# Patient Record
Sex: FEMALE | Race: WHITE | Employment: OTHER | URBAN - METROPOLITAN AREA
[De-identification: names, ages, dates, MRNs, and addresses within clinical notes are randomized per-mention and may not be internally consistent; named-entity substitution may affect disease eponyms.]

---

## 2021-07-07 ENCOUNTER — APPOINTMENT (OUTPATIENT)
Dept: CT IMAGING | Age: 81
End: 2021-07-07
Attending: EMERGENCY MEDICINE
Payer: MEDICARE

## 2021-07-07 ENCOUNTER — HOSPITAL ENCOUNTER (EMERGENCY)
Age: 81
Discharge: HOME | End: 2021-07-07
Attending: EMERGENCY MEDICINE
Payer: MEDICARE

## 2021-07-07 VITALS
SYSTOLIC BLOOD PRESSURE: 151 MMHG | TEMPERATURE: 99 F | HEART RATE: 78 BPM | OXYGEN SATURATION: 96 % | DIASTOLIC BLOOD PRESSURE: 63 MMHG | WEIGHT: 100 LBS | BODY MASS INDEX: 17.72 KG/M2 | HEIGHT: 63 IN | RESPIRATION RATE: 18 BRPM

## 2021-07-07 DIAGNOSIS — R19.7 DIARRHEA OF PRESUMED INFECTIOUS ORIGIN: Primary | ICD-10-CM

## 2021-07-07 LAB
ALBUMIN SERPL-MCNC: 3.5 G/DL (ref 3.4–5)
ALBUMIN/GLOB SERPL: 1.1 {RATIO} (ref 1–2)
ALP LIVER SERPL-CCNC: 58 U/L
ALT SERPL-CCNC: 17 U/L
ANION GAP SERPL CALC-SCNC: 6 MMOL/L (ref 0–18)
AST SERPL-CCNC: 23 U/L (ref 15–37)
BASOPHILS # BLD AUTO: 0.03 X10(3) UL (ref 0–0.2)
BASOPHILS NFR BLD AUTO: 0.3 %
BILIRUB SERPL-MCNC: 0.5 MG/DL (ref 0.1–2)
BILIRUB UR QL STRIP.AUTO: NEGATIVE
BUN BLD-MCNC: 12 MG/DL (ref 7–18)
BUN/CREAT SERPL: 11.4 (ref 10–20)
CALCIUM BLD-MCNC: 9.1 MG/DL (ref 8.5–10.1)
CHLORIDE SERPL-SCNC: 107 MMOL/L (ref 98–112)
CLARITY UR REFRACT.AUTO: CLEAR
CO2 SERPL-SCNC: 26 MMOL/L (ref 21–32)
COLOR UR AUTO: YELLOW
CREAT BLD-MCNC: 1.05 MG/DL
DEPRECATED RDW RBC AUTO: 43.5 FL (ref 35.1–46.3)
EOSINOPHIL # BLD AUTO: 0.21 X10(3) UL (ref 0–0.7)
EOSINOPHIL NFR BLD AUTO: 2.4 %
ERYTHROCYTE [DISTWIDTH] IN BLOOD BY AUTOMATED COUNT: 13.2 % (ref 11–15)
GLOBULIN PLAS-MCNC: 3.3 G/DL (ref 2.8–4.4)
GLUCOSE BLD-MCNC: 124 MG/DL (ref 70–99)
GLUCOSE UR STRIP.AUTO-MCNC: NEGATIVE MG/DL
HCT VFR BLD AUTO: 40.1 %
HGB BLD-MCNC: 13.4 G/DL
IMM GRANULOCYTES # BLD AUTO: 0.03 X10(3) UL (ref 0–1)
IMM GRANULOCYTES NFR BLD: 0.3 %
KETONES UR STRIP.AUTO-MCNC: NEGATIVE MG/DL
LIPASE SERPL-CCNC: 229 U/L (ref 73–393)
LYMPHOCYTES # BLD AUTO: 2.15 X10(3) UL (ref 1–4)
LYMPHOCYTES NFR BLD AUTO: 24.4 %
M PROTEIN MFR SERPL ELPH: 6.8 G/DL (ref 6.4–8.2)
MCH RBC QN AUTO: 29.9 PG (ref 26–34)
MCHC RBC AUTO-ENTMCNC: 33.4 G/DL (ref 31–37)
MCV RBC AUTO: 89.5 FL
MONOCYTES # BLD AUTO: 0.65 X10(3) UL (ref 0.1–1)
MONOCYTES NFR BLD AUTO: 7.4 %
NEUTROPHILS # BLD AUTO: 5.74 X10 (3) UL (ref 1.5–7.7)
NEUTROPHILS # BLD AUTO: 5.74 X10(3) UL (ref 1.5–7.7)
NEUTROPHILS NFR BLD AUTO: 65.2 %
NITRITE UR QL STRIP.AUTO: NEGATIVE
OSMOLALITY SERPL CALC.SUM OF ELEC: 289 MOSM/KG (ref 275–295)
PH UR STRIP.AUTO: 6 [PH] (ref 5–8)
PLATELET # BLD AUTO: 221 10(3)UL (ref 150–450)
POTASSIUM SERPL-SCNC: 3.7 MMOL/L (ref 3.5–5.1)
PROT UR STRIP.AUTO-MCNC: NEGATIVE MG/DL
RBC # BLD AUTO: 4.48 X10(6)UL
SODIUM SERPL-SCNC: 139 MMOL/L (ref 136–145)
SP GR UR STRIP.AUTO: 1.01 (ref 1–1.03)
UROBILINOGEN UR STRIP.AUTO-MCNC: 0.2 MG/DL
WBC # BLD AUTO: 8.8 X10(3) UL (ref 4–11)

## 2021-07-07 PROCEDURE — 83690 ASSAY OF LIPASE: CPT | Performed by: EMERGENCY MEDICINE

## 2021-07-07 PROCEDURE — 81001 URINALYSIS AUTO W/SCOPE: CPT | Performed by: EMERGENCY MEDICINE

## 2021-07-07 PROCEDURE — 80053 COMPREHEN METABOLIC PANEL: CPT | Performed by: EMERGENCY MEDICINE

## 2021-07-07 PROCEDURE — 87186 SC STD MICRODIL/AGAR DIL: CPT | Performed by: EMERGENCY MEDICINE

## 2021-07-07 PROCEDURE — 96360 HYDRATION IV INFUSION INIT: CPT

## 2021-07-07 PROCEDURE — 99284 EMERGENCY DEPT VISIT MOD MDM: CPT

## 2021-07-07 PROCEDURE — 96361 HYDRATE IV INFUSION ADD-ON: CPT

## 2021-07-07 PROCEDURE — 87088 URINE BACTERIA CULTURE: CPT | Performed by: EMERGENCY MEDICINE

## 2021-07-07 PROCEDURE — 87086 URINE CULTURE/COLONY COUNT: CPT | Performed by: EMERGENCY MEDICINE

## 2021-07-07 PROCEDURE — 85025 COMPLETE CBC W/AUTO DIFF WBC: CPT | Performed by: EMERGENCY MEDICINE

## 2021-07-07 PROCEDURE — 74177 CT ABD & PELVIS W/CONTRAST: CPT | Performed by: EMERGENCY MEDICINE

## 2021-07-07 RX ORDER — ONDANSETRON 8 MG/1
8 TABLET, ORALLY DISINTEGRATING ORAL EVERY 6 HOURS PRN
Qty: 10 TABLET | Refills: 0 | Status: SHIPPED | OUTPATIENT
Start: 2021-07-07

## 2021-07-07 RX ORDER — ONDANSETRON 2 MG/ML
4 INJECTION INTRAMUSCULAR; INTRAVENOUS
Status: DISCONTINUED | OUTPATIENT
Start: 2021-07-07 | End: 2021-07-07

## 2021-07-07 RX ORDER — MEMANTINE HYDROCHLORIDE 10 MG/1
10 TABLET ORAL 2 TIMES DAILY
COMMUNITY

## 2021-07-07 NOTE — ED PROVIDER NOTES
Patient Seen in: [facility] In [place]      History   Patient presents with:  Nausea/Vomiting/Diarrhea    Stated Complaint: diarrhea, dehydration    HPI/Subjective:   HPI    Patient is a poor historian secondary history of Alzheimer's kg/m²         Physical Exam  General: The patient is awake and alert. She will answer some simple questions but her memory is very poor.   Eyes: sclera white, conjunctiva pink and moist.  Lids and lashes are normal.  Nose: Unremarkable without purulent joslyn ------                     CBC W/ DIFFERENTIAL[[id number]]                              Final result                 Please view results for these tests on the individual orders.    RAINBOW DRAW LAVENDER   RAINBOW DRAW LIGHT GREEN   C. DIFFICILE(TOXIG per her request encouraged to follow-up closely with her primary care doctor                       Disposition and Plan     Clinical Impression:  Diarrhea of presumed infectious origin  (primary encounter diagnosis)     Disposition:  Discharge  7/7/2021 12

## 2021-07-10 NOTE — ED NOTES
Discussed culture results with patient daughter - patient not having UTI symptoms at this time and diarrhea is resolving. Discussed with patient daughter to monitor for new or worsening symptoms and to f/u as needed. Verbalized understanding.

## 2024-01-01 ENCOUNTER — APPOINTMENT (OUTPATIENT)
Dept: GENERAL RADIOLOGY | Age: 84
End: 2024-01-01
Payer: MEDICARE

## 2024-01-01 ENCOUNTER — HOSPITAL ENCOUNTER (EMERGENCY)
Age: 84
Discharge: HOME | End: 2024-01-01
Attending: STUDENT IN AN ORGANIZED HEALTH CARE EDUCATION/TRAINING PROGRAM
Payer: MEDICARE

## 2024-01-01 VITALS
OXYGEN SATURATION: 96 % | DIASTOLIC BLOOD PRESSURE: 67 MMHG | SYSTOLIC BLOOD PRESSURE: 134 MMHG | WEIGHT: 100 LBS | RESPIRATION RATE: 18 BRPM | HEART RATE: 76 BPM | TEMPERATURE: 99 F | HEIGHT: 63 IN | BODY MASS INDEX: 17.72 KG/M2

## 2024-01-01 DIAGNOSIS — J40 BRONCHITIS WITH ACUTE WHEEZING: Primary | ICD-10-CM

## 2024-01-01 LAB
ANION GAP SERPL CALC-SCNC: 9 MMOL/L (ref 0–18)
BASOPHILS # BLD AUTO: 0.03 X10(3) UL (ref 0–0.2)
BASOPHILS NFR BLD AUTO: 0.5 %
BUN BLD-MCNC: 21 MG/DL (ref 9–23)
CALCIUM BLD-MCNC: 10 MG/DL (ref 8.5–10.1)
CHLORIDE SERPL-SCNC: 108 MMOL/L (ref 98–112)
CO2 SERPL-SCNC: 21 MMOL/L (ref 21–32)
CREAT BLD-MCNC: 1.42 MG/DL
EGFRCR SERPLBLD CKD-EPI 2021: 37 ML/MIN/1.73M2 (ref 60–?)
EOSINOPHIL # BLD AUTO: 0.04 X10(3) UL (ref 0–0.7)
EOSINOPHIL NFR BLD AUTO: 0.7 %
ERYTHROCYTE [DISTWIDTH] IN BLOOD BY AUTOMATED COUNT: 14.1 %
GLUCOSE BLD-MCNC: 110 MG/DL (ref 70–99)
HCT VFR BLD AUTO: 43.1 %
HGB BLD-MCNC: 14.2 G/DL
IMM GRANULOCYTES # BLD AUTO: 0.02 X10(3) UL (ref 0–1)
IMM GRANULOCYTES NFR BLD: 0.3 %
LYMPHOCYTES # BLD AUTO: 1.32 X10(3) UL (ref 1–4)
LYMPHOCYTES NFR BLD AUTO: 21.9 %
MCH RBC QN AUTO: 30.1 PG (ref 26–34)
MCHC RBC AUTO-ENTMCNC: 32.9 G/DL (ref 31–37)
MCV RBC AUTO: 91.5 FL
MONOCYTES # BLD AUTO: 0.47 X10(3) UL (ref 0.1–1)
MONOCYTES NFR BLD AUTO: 7.8 %
NEUTROPHILS # BLD AUTO: 4.14 X10 (3) UL (ref 1.5–7.7)
NEUTROPHILS # BLD AUTO: 4.14 X10(3) UL (ref 1.5–7.7)
NEUTROPHILS NFR BLD AUTO: 68.8 %
OSMOLALITY SERPL CALC.SUM OF ELEC: 290 MOSM/KG (ref 275–295)
PLATELET # BLD AUTO: 213 10(3)UL (ref 150–450)
POTASSIUM SERPL-SCNC: 4.5 MMOL/L (ref 3.5–5.1)
RBC # BLD AUTO: 4.71 X10(6)UL
SODIUM SERPL-SCNC: 138 MMOL/L (ref 136–145)
WBC # BLD AUTO: 6 X10(3) UL (ref 4–11)

## 2024-01-01 PROCEDURE — 99284 EMERGENCY DEPT VISIT MOD MDM: CPT

## 2024-01-01 PROCEDURE — 71046 X-RAY EXAM CHEST 2 VIEWS: CPT

## 2024-01-01 PROCEDURE — 80048 BASIC METABOLIC PNL TOTAL CA: CPT | Performed by: STUDENT IN AN ORGANIZED HEALTH CARE EDUCATION/TRAINING PROGRAM

## 2024-01-01 PROCEDURE — 85025 COMPLETE CBC W/AUTO DIFF WBC: CPT | Performed by: STUDENT IN AN ORGANIZED HEALTH CARE EDUCATION/TRAINING PROGRAM

## 2024-01-01 PROCEDURE — 94640 AIRWAY INHALATION TREATMENT: CPT

## 2024-01-01 PROCEDURE — 96360 HYDRATION IV INFUSION INIT: CPT

## 2024-01-01 RX ORDER — PREDNISONE 20 MG/1
20 TABLET ORAL DAILY
Qty: 4 TABLET | Refills: 0 | Status: SHIPPED | OUTPATIENT
Start: 2024-01-01 | End: 2024-01-05

## 2024-01-01 RX ORDER — PREDNISONE 20 MG/1
40 TABLET ORAL ONCE
Status: COMPLETED | OUTPATIENT
Start: 2024-01-01 | End: 2024-01-01

## 2024-01-01 RX ORDER — ALBUTEROL SULFATE 90 UG/1
2 AEROSOL, METERED RESPIRATORY (INHALATION) EVERY 4 HOURS PRN
Qty: 1 EACH | Refills: 0 | Status: SHIPPED | OUTPATIENT
Start: 2024-01-01 | End: 2024-01-31

## 2024-01-01 RX ORDER — FLUTICASONE PROPIONATE AND SALMETEROL 100; 50 UG/1; UG/1
1 POWDER RESPIRATORY (INHALATION) 2 TIMES DAILY
Qty: 1 EACH | Refills: 0 | Status: SHIPPED | OUTPATIENT
Start: 2024-01-01

## 2024-01-01 RX ORDER — IPRATROPIUM BROMIDE AND ALBUTEROL SULFATE 2.5; .5 MG/3ML; MG/3ML
3 SOLUTION RESPIRATORY (INHALATION) ONCE
Status: COMPLETED | OUTPATIENT
Start: 2024-01-01 | End: 2024-01-01

## 2024-01-01 NOTE — ED INITIAL ASSESSMENT (HPI)
82 y/o F arrives to ED with c/o SOB and cough that started 12/25. Per family, patient was placed on zpac on Friday 12/29. Patient had one episode of diarrhea this morning. Per family, patient has not had much of an appetite recently.

## 2024-01-02 NOTE — ED PROVIDER NOTES
History     Chief Complaint   Patient presents with    Difficulty Breathing    Cough/URI       HPI    83 year old female with history of hypertension, hyperlipidemia, Alzheimer's dementia brought in by family for evaluation of cough.  For the past week patient has been having dyspnea, cough, congestion, sore throat, lost her voice with worsening symptoms around [place] time.  Symptoms have slightly improved but are persistent.  Patient now has generalized fatigue and decreased appetite.  They were prescribed a Z-Kain which has not helped.  They report a prior history of asthma, patient is been smoking for many many years, denies a prior history of COPD.  She previously had albuterol that she was using but no longer has any inhalers.          Past Medical History:   Diagnosis Date    Alzheimer disease (HCC)     Carotid stenosis     Colon polyps     GI bleed     High blood pressure     High cholesterol     Hypoglycemia     MVP (mitral valve prolapse)     Sebaceous cyst        Past Surgical History:   Procedure Laterality Date    ADENOIDECTOMY      OOPHORECTOMY      TONSILLECTOMY         Social History     Socioeconomic History    Marital status:    Tobacco Use    Smoking status: Every Day     Packs/day: .5     Types: Cigarettes    Smokeless tobacco: Never   Vaping Use    Vaping Use: Never used   Substance and Sexual Activity    Alcohol use: No    Drug use: No                   Physical Exam     ED Triage Vitals [01/01/24 1706]   /58   Pulse 103   Resp 19   Temp 98.9 °F (37.2 °C)   Temp src Temporal   SpO2 96 %   O2 Device None (Room air)       Physical Exam  Constitutional:       General: She is not in acute distress.  Eyes:      Extraocular Movements: Extraocular movements intact.   Cardiovascular:      Rate and Rhythm: Normal rate.      Pulses: Normal pulses.   Pulmonary:      Effort: Pulmonary effort is normal. No respiratory distress.      Breath sounds: Wheezing present.      Comments:  Prolonged expiratory phase  Musculoskeletal:      Cervical back: Normal range of motion.      Right lower leg: No edema.      Left lower leg: No edema.   Neurological:      General: No focal deficit present.      Mental Status: She is alert.              ED Course     Labs Reviewed   BASIC METABOLIC PANEL (8) - Abnormal; Notable for the following components:       Result Value    Glucose 110 (*)     Creatinine 1.42 (*)     eGFR-Cr 37 (*)     All other components within normal limits   CBC WITH DIFFERENTIAL WITH PLATELET    Narrative:     The following orders were created for panel order CBC With Differential With Platelet.  Procedure                               Abnormality         Status                     ---------                               -----------         ------                     CBC W/ DIFFERENTIAL[[id number]]                              Final result                 Please view results for these tests on the individual orders.   RAINBOW DRAW LAVENDER   RAINBOW DRAW LIGHT GREEN   CBC W/ DIFFERENTIAL     XR CHEST PA + LAT CHEST (CPT=71046)    Result Date: 1/1/2024  CONCLUSION:   Normal cardiac and mediastinal contours.  The lungs are hyperexpanded with biapical parenchymal scarring.  No airspace consolidation.  The pleural spaces are clear.   LOCATION:  [facility]   Dictated by (CST): Gloria Cisneros MD on 1/01/2024 at 6:03 PM     Finalized by (CST): Gloria Cisneros MD on 1/01/2024 at 6:03 PM            MDM     Vitals:    01/01/24 1706 01/01/24 1959   BP: 109/58 134/67   Pulse: 103 76   Resp: 19 18   Temp: 98.9 °F (37.2 °C)    TempSrc: Temporal    SpO2: 96% 96%   Weight: 45.4 kg    Height: 160 cm (5' 3\")        Viral upper respiratory infection, bronchitis, asthma, pneumonia on differential.  Very likely undiagnosed COPD.  Barrel chest noted on x-ray, will give patient DuoNeb, check labs give fluids and reevaluate.    ED Course as of 01/01/24  2008  ------------------------------------------------------------  Time: 01/01 1840  Comment: My interpretation of x-ray with hyperexpanded lungs, no other acute pathology.  ------------------------------------------------------------  Time: 01/01 1938  Comment: Labs with mild CODY, given intravenous hydration here  ------------------------------------------------------------  Time: 01/01 2005  Comment: Reevaluation, patient is feeling improved.  Improved lung sounds.  Discussed findings with patient and family at bedside.  Will send home with prednisone burst, start patient on Advair, pulmonary referral.         Disposition and Plan     Clinical Impression:  1. Bronchitis with acute wheezing        Disposition:  Discharge    Follow-up:  Cristi Lin MD  [address] [place] [address]   [place] [address]  [phone]    Follow up        Medications Prescribed:  Current Discharge Medication List        START taking these medications    Details   fluticasone-salmeterol (ADVAIR DISKUS) 100-50 MCG/ACT Inhalation Aerosol Powder, Breath Activated Inhale 1 puff into the lungs 2 (two) times daily.  Qty: 1 each, Refills: 0      predniSONE 20 MG Oral Tab Take 1 tablet (20 mg total) by mouth daily for 4 days.  Qty: 4 tablet, Refills: 0      !! albuterol 108 (90 Base) MCG/ACT Inhalation Aero Soln Inhale 2 puffs into the lungs every 4 (four) hours as needed for Wheezing.  Qty: 1 each, Refills: 0       !! - Potential duplicate medications found. Please discuss with provider.

## 2025-05-05 NOTE — ED INITIAL ASSESSMENT (HPI)
Recent constipation and was given stool softner and now with blood in stool and abd pain.  C/o nausea

## 2025-05-05 NOTE — ED PROVIDER NOTES
Patient Seen in: [facility] In [place]      History     Chief Complaint   Patient presents with    GI Bleeding     Stated Complaint: blood in stool with abd pain    Subjective:   HPI    84-year-old female who presents after family states they noted blood in the toilet today.  They report she has been persistently reporting the urge to have a bowel movement and I think based on the toilet today was blood and they do not believe she has had a bowel movement in some time.  She has some history of constipation.  They have been trying stool softeners when she has had no relief.  Have not tried enemas.  Patient suffers from dementia and family reports she is a very poor historian.  No reports of vomiting.  History of Present Illness               Objective:     Past Medical History:    Alzheimer disease (HCC)    Carotid stenosis    Colon polyps    GI bleed    High blood pressure    High cholesterol    Hypoglycemia    MVP (mitral valve prolapse)    Sebaceous cyst              Past Surgical History:   Procedure Laterality Date    Adenoidectomy      Oophorectomy      Tonsillectomy                  Social History     Socioeconomic History    Marital status:    Tobacco Use    Smoking status: Every Day     Current packs/day: 0.50     Types: Cigarettes    Smokeless tobacco: Never   Vaping Use    Vaping status: Never Used   Substance and Sexual Activity    Alcohol use: No    Drug use: No                                Physical Exam     ED Triage Vitals [05/05/25 1031]   BP (!) 177/81   Pulse 110   Resp 18   Temp 98.2 °F (36.8 °C)   Temp src Temporal   SpO2 96 %   O2 Device None (Room air)       Current Vitals:   Vital Signs  BP: (!) 168/92  Pulse: 94  Resp: 18  Temp: 98.1 °F (36.7 °C)  Temp src: Oral    Oxygen Therapy  SpO2: 97 %  O2 Device: None (Room air)        Physical Exam  General:  Vitals as listed.    HEENT: Sclerae anicteric.  Conjunctivae show no pallor.  Oropharynx clear, mucous membranes  moist   Neck: supple, no rigidity   Lungs: good air exchange and clear   Heart: regular rate rhythm and no murmur   Abdomen: Soft and nontender.  No abdominal masses.  No peritoneal signs   Rectum: No stool in the rectal vault.  No gross blood.  Extremities: no edema, normal peripheral pulses   Neuro: Alert oriented and nonfocal   Skin: no rashes or nodules  Physical Exam                ED Course     Labs Reviewed   COMP METABOLIC PANEL (14) - Abnormal; Notable for the following components:       Result Value    Glucose 123 (*)     BUN 26 (*)     Creatinine 1.26 (*)     Calculated Osmolality 300 (*)     eGFR-Cr 43 (*)     All other components within normal limits   CBC WITH DIFFERENTIAL WITH PLATELET - Abnormal; Notable for the following components:    WBC 12.8 (*)     Neutrophil Absolute Prelim 10.43 (*)     Neutrophil Absolute 10.43 (*)     All other components within normal limits          Results            CT ABDOMEN+PELVIS(CPT=74176)  Result Date: 5/5/2025  PROCEDURE:  CT ABDOMEN+PELVIS (CPT=74176)  COMPARISON:  [facility], CT, CT ABDOMEN+PELVIS(CONTRAST ONLY)(CPT=74177), 7/07/2021, 11:28 AM.  INDICATIONS:  blood in stool with abd pain  TECHNIQUE:  Unenhanced multislice CT scanning was performed from the dome of the diaphragm to the pubic symphysis.  Dose reduction techniques were used. Dose information is transmitted to the ACR (American College of Radiology) NRDR (National Radiology Data Registry) which includes the Dose Index Registry.  PATIENT STATED HISTORY: (As transcribed by Technologist)  Patient has recent constipation and was given stool softner and now with blood in stool with abdominal pains and nausea.    FINDINGS:  LIVER:  9 mm cyst in the right hepatic lobe.  Digital subcentimeter lesions are present, which are too small to characterize.  No steatosis.  Normal morphology. BILIARY:  No visible dilatation or calcification.  PANCREAS:  No lesion, fluid collection, ductal dilatation, or atrophy.   SPLEEN:  No enlargement or focal lesion.  KIDNEYS:  Multiple renal cysts, largest measuring up to 2.7 cm in the right kidney.  No hydronephrosis or obstructive nephrolithiasis. ADRENALS:  No mass or enlargement.  AORTA/VASCULAR:    Atherosclerotic disease without aneurysm RETROPERITONEUM:  No mass or adenopathy.  BOWEL/MESENTERY:  No dilated bowel.  Colonic diverticulosis, without diverticulitis.  Normal appendix.  The descending colon is collapsed, however there is suggestion of wall thickening with pericolonic fluid sand fat stranding, which could reflect non-specific colitis. ABDOMINAL WALL:  No significant findings URINARY BLADDER:  No visible focal wall thickening, lesion, or calculus.  PELVIC NODES:  No adenopathy.  PELVIC ORGANS:  No visible mass.  Pelvic organs appropriate for patient age.  BONES:  No bony lesion or fracture.  LUNG BASES:  Emphysema OTHER:  Negative.             CONCLUSION:  1. Fluid and fat stranding surrounding the descending colon with probable wall thickening in this region suggestive of nonspecific colitis. 2. Colonic diverticulosis. 3. Emphysema. 4. Please see above for further details.   LOCATION:  [facility]   Dictated by (CST): French Pearson MD on 5/05/2025 at 4:00 PM     Finalized by (CST): French Pearson MD on 5/05/2025 at 4:04 PM       XR ABDOMEN (1 VIEW) (CPT=74018)  Result Date: 5/5/2025  PROCEDURE:  XR ABDOMEN (1 VIEW) (CPT=74018)  INDICATIONS:  blood in stool with abd pain  COMPARISON:  None.  TECHNIQUE:  Supine AP view was obtained.  PATIENT STATED HISTORY: (As transcribed by Technologist)  Patient complains of mid abdominal pain and constipation for a few days.  Her last bowel movement was on 5/4/25.  She denies any nausea or vomiting.    FINDINGS:  BOWEL GAS PATTERN:  Bowel mildly dilated loop of small bowel within the pelvis measuring up to 3.6 cm.  There is air within nondilated colon.  Findings could reflect a small bowel obstruction.  CALCIFICATIONS:  None significant.  OTHER:  Negative.  No abnormal gaseous collections.             CONCLUSION:  Dilated loop of air-filled small bowel in the pelvis with nondilated colon.  Findings concerning for possible small bowel obstruction.  Consider further evaluation with CT abdomen pelvis.   LOCATION:  [facility]   Dictated by (CST): French Pearson MD on 5/05/2025 at 2:43 PM     Finalized by (CST): French Pearson MD on 5/05/2025 at 2:45 PM                      MDM      84-year-old female brought in by family who reports she keeps complaining that she feels like she needs to have a bowel movement but has not been passing a lot of stool.  They noticed bright red blood in the toilet bowl today.  They thought she might have some hemorrhoids.    Differential includes but is not limited to anemia, GI bleed, colitis, fecal impaction, constipation, a life/function threat.    CBC, CMP, x-ray abdomen ordered for further evaluation.    My independent interpretation of x-ray of the abdomen is that there is not significant constipation.  Radiology reports concern for possible SBO and recommend CT scan which was performed and per radiology documentation shows evidence of nonspecific colitis.  Colitis likely causing urge to defecate and potentially some of the bleeding.  Hemoglobin is 14.9 today.  There is no suggestion of significant blood loss.  Recommend follow-up with GI specialty.            Medical Decision Making      Disposition and Plan     Clinical Impression:  1. Colitis    2. BRBPR (bright red blood per rectum)         Disposition:  Discharge  5/5/2025  4:21 pm    Follow-up:  Elfego French  [address] [id number] [phone]    Schedule an appointment as soon as possible for a visit      Andrzej Hawkins MD  [address]  [facility] [id number] [phone]    Schedule an appointment as soon as possible for a visit  GI specialist    [facility] in [address]  [address]  [phone]  Follow up  If symptoms worsen          Medications Prescribed:  Current Discharge Medication List          Supplementary Documentation:

## 2025-05-05 NOTE — DISCHARGE INSTRUCTIONS
The CT scan performed today shows evidence of colitis.  This can be bacterial, inflammatory, viral.  It does not typically require antibiotics.  The inflammation of the colon can cause the urge to have a bowel movement even though there is no stool in the area.  It can also cause some bleeding.  Colitis as well as internal hemorrhoids could be contributing to blood that was noted in the toilet.  I recommend follow-up with the GI specialist and have provided clinic information.  Please continue monitoring the symptoms and if you see significantly worsening blood or abdominal pain, please return to the emergency room.